# Patient Record
Sex: FEMALE | Race: WHITE | URBAN - METROPOLITAN AREA
[De-identification: names, ages, dates, MRNs, and addresses within clinical notes are randomized per-mention and may not be internally consistent; named-entity substitution may affect disease eponyms.]

---

## 2017-11-20 ENCOUNTER — HOSPITAL ENCOUNTER (OUTPATIENT)
Dept: SURGERY | Age: 62
Discharge: HOME | End: 2017-11-20
Attending: PODIATRIST | Admitting: PODIATRIST

## 2017-11-20 VITALS
SYSTOLIC BLOOD PRESSURE: 134 MMHG | OXYGEN SATURATION: 98 % | DIASTOLIC BLOOD PRESSURE: 87 MMHG | WEIGHT: 173 LBS | TEMPERATURE: 97.8 F | RESPIRATION RATE: 12 BRPM | HEIGHT: 62 IN | HEART RATE: 66 BPM | BODY MASS INDEX: 31.83 KG/M2

## 2017-11-20 RX ORDER — HYDROMORPHONE HCL 110MG/55ML
0.5 PATIENT CONTROLLED ANALGESIA SYRINGE INTRAVENOUS EVERY 5 MIN PRN
Status: DISCONTINUED | OUTPATIENT
Start: 2017-11-20 | End: 2017-11-21 | Stop reason: HOSPADM

## 2017-11-20 RX ORDER — HYDROMORPHONE HCL 110MG/55ML
0.25 PATIENT CONTROLLED ANALGESIA SYRINGE INTRAVENOUS EVERY 5 MIN PRN
Status: DISCONTINUED | OUTPATIENT
Start: 2017-11-20 | End: 2017-11-21 | Stop reason: HOSPADM

## 2017-11-20 RX ORDER — OXYCODONE HYDROCHLORIDE 5 MG/1
5 TABLET ORAL PRN
Status: ACTIVE | OUTPATIENT
Start: 2017-11-20 | End: 2017-11-20

## 2017-11-20 RX ORDER — LIDOCAINE HYDROCHLORIDE 10 MG/ML
0.5 INJECTION, SOLUTION EPIDURAL; INFILTRATION; INTRACAUDAL; PERINEURAL ONCE
Status: DISCONTINUED | OUTPATIENT
Start: 2017-11-20 | End: 2017-11-21 | Stop reason: HOSPADM

## 2017-11-20 RX ORDER — SODIUM CHLORIDE 0.9 % (FLUSH) 0.9 %
10 SYRINGE (ML) INJECTION PRN
Status: DISCONTINUED | OUTPATIENT
Start: 2017-11-20 | End: 2017-11-21 | Stop reason: HOSPADM

## 2017-11-20 RX ORDER — SODIUM CHLORIDE 9 MG/ML
INJECTION, SOLUTION INTRAVENOUS CONTINUOUS
Status: DISCONTINUED | OUTPATIENT
Start: 2017-11-20 | End: 2017-11-21 | Stop reason: HOSPADM

## 2017-11-20 RX ORDER — FENTANYL CITRATE 50 UG/ML
50 INJECTION, SOLUTION INTRAMUSCULAR; INTRAVENOUS EVERY 5 MIN PRN
Status: DISCONTINUED | OUTPATIENT
Start: 2017-11-20 | End: 2017-11-21 | Stop reason: HOSPADM

## 2017-11-20 RX ORDER — OXYCODONE HYDROCHLORIDE AND ACETAMINOPHEN 5; 325 MG/1; MG/1
1 TABLET ORAL EVERY 6 HOURS PRN
Qty: 30 TABLET | Refills: 0 | Status: SHIPPED | OUTPATIENT
Start: 2017-11-20 | End: 2017-11-27

## 2017-11-20 RX ORDER — LIDOCAINE HYDROCHLORIDE 10 MG/ML
1 INJECTION, SOLUTION EPIDURAL; INFILTRATION; INTRACAUDAL; PERINEURAL
Status: ACTIVE | OUTPATIENT
Start: 2017-11-20 | End: 2017-11-20

## 2017-11-20 RX ORDER — OXYCODONE HYDROCHLORIDE AND ACETAMINOPHEN 5; 325 MG/1; MG/1
1 TABLET ORAL
Status: COMPLETED | OUTPATIENT
Start: 2017-11-20 | End: 2017-11-20

## 2017-11-20 RX ORDER — FENTANYL CITRATE 50 UG/ML
25 INJECTION, SOLUTION INTRAMUSCULAR; INTRAVENOUS EVERY 5 MIN PRN
Status: DISCONTINUED | OUTPATIENT
Start: 2017-11-20 | End: 2017-11-21 | Stop reason: HOSPADM

## 2017-11-20 RX ORDER — SODIUM CHLORIDE 0.9 % (FLUSH) 0.9 %
10 SYRINGE (ML) INJECTION EVERY 12 HOURS SCHEDULED
Status: DISCONTINUED | OUTPATIENT
Start: 2017-11-20 | End: 2017-11-21 | Stop reason: HOSPADM

## 2017-11-20 RX ORDER — ONDANSETRON 2 MG/ML
4 INJECTION INTRAMUSCULAR; INTRAVENOUS PRN
Status: DISCONTINUED | OUTPATIENT
Start: 2017-11-20 | End: 2017-11-21 | Stop reason: HOSPADM

## 2017-11-20 RX ORDER — CEFAZOLIN SODIUM 2 G/100ML
2 INJECTION, SOLUTION INTRAVENOUS
Status: COMPLETED | OUTPATIENT
Start: 2017-11-20 | End: 2017-11-20

## 2017-11-20 RX ORDER — OXYCODONE HYDROCHLORIDE 5 MG/1
10 TABLET ORAL PRN
Status: ACTIVE | OUTPATIENT
Start: 2017-11-20 | End: 2017-11-20

## 2017-11-20 RX ORDER — MEPERIDINE HYDROCHLORIDE 25 MG/ML
12.5 INJECTION INTRAMUSCULAR; INTRAVENOUS; SUBCUTANEOUS EVERY 5 MIN PRN
Status: DISCONTINUED | OUTPATIENT
Start: 2017-11-20 | End: 2017-11-21 | Stop reason: HOSPADM

## 2017-11-20 RX ORDER — PROMETHAZINE HYDROCHLORIDE 25 MG/1
25 TABLET ORAL EVERY 6 HOURS PRN
Qty: 30 TABLET | Refills: 0 | Status: SHIPPED | OUTPATIENT
Start: 2017-11-20 | End: 2017-11-27

## 2017-11-20 RX ORDER — ONDANSETRON 2 MG/ML
4 INJECTION INTRAMUSCULAR; INTRAVENOUS
Status: ACTIVE | OUTPATIENT
Start: 2017-11-20 | End: 2017-11-20

## 2017-11-20 RX ORDER — SODIUM CHLORIDE, SODIUM LACTATE, POTASSIUM CHLORIDE, CALCIUM CHLORIDE 600; 310; 30; 20 MG/100ML; MG/100ML; MG/100ML; MG/100ML
INJECTION, SOLUTION INTRAVENOUS CONTINUOUS
Status: DISCONTINUED | OUTPATIENT
Start: 2017-11-20 | End: 2017-11-21 | Stop reason: HOSPADM

## 2017-11-20 RX ORDER — OXYCODONE HYDROCHLORIDE AND ACETAMINOPHEN 5; 325 MG/1; MG/1
TABLET ORAL
Status: DISCONTINUED
Start: 2017-11-20 | End: 2017-11-21 | Stop reason: HOSPADM

## 2017-11-20 RX ADMIN — CEFAZOLIN SODIUM 2 G: 2 INJECTION, SOLUTION INTRAVENOUS at 13:12

## 2017-11-20 RX ADMIN — SODIUM CHLORIDE, SODIUM LACTATE, POTASSIUM CHLORIDE, CALCIUM CHLORIDE: 600; 310; 30; 20 INJECTION, SOLUTION INTRAVENOUS at 11:36

## 2017-11-20 RX ADMIN — OXYCODONE HYDROCHLORIDE AND ACETAMINOPHEN 1 TABLET: 5; 325 TABLET ORAL at 15:36

## 2017-11-20 ASSESSMENT — PAIN DESCRIPTION - LOCATION: LOCATION: FOOT

## 2017-11-20 ASSESSMENT — PAIN DESCRIPTION - ORIENTATION: ORIENTATION: RIGHT

## 2017-11-20 ASSESSMENT — PAIN DESCRIPTION - PAIN TYPE: TYPE: SURGICAL PAIN

## 2017-11-20 ASSESSMENT — PAIN - FUNCTIONAL ASSESSMENT: PAIN_FUNCTIONAL_ASSESSMENT: 0-10

## 2017-11-20 ASSESSMENT — PAIN SCALES - GENERAL: PAINLEVEL_OUTOF10: 6

## 2017-11-20 NOTE — ANESTHESIA PRE-OP
[facility] Anesthesiology  Pre-Anesthesia Evaluation/Consultation       Name:  Queta Hardwick                                         Age:  65 y.o. MRN:    [id number]           Procedure (Scheduled): FOOT BUNIONECTOMY   Surgeon:     Dr. Garnett Him, DPM     Allergies   Allergen Reactions    Ibuprofen     Sulfa Antibiotics     Vicodin [Hydrocodone-Acetaminophen] Nausea And Vomiting     Patient Active Problem List   Diagnosis    Abdominal pain    Postmenopausal bleeding    Pyometra     Past Medical History:   Diagnosis Date    ADHD (attention deficit hyperactivity disorder)     Chronic back pain     Chronic interstitial cystitis     DDD (degenerative disc disease), lumbar     Hives     Nausea & vomiting     PONV (postoperative nausea and vomiting)     Thyroid disease     no meds    TIA (transient ischaemic attack)      Past Surgical History:   Procedure Laterality Date    APPENDECTOMY  1988    CERVIX SURGERY  1970s    [facility] for \"bad cells\"   [address]; 1987    x 2    CHOLECYSTECTOMY  2007    CYSTOSCOPY  2007    DILATION AND CURETTAGE OF UTERUS  9/30/10    hysteroscopy    DILATION AND CURETTAGE OF UTERUS  08/15/2011    HYSTEROSCOPY     Social History   Substance Use Topics    Smoking status: Never Smoker    Smokeless tobacco: Never Used    Alcohol use No       Prior to Admission medications    Medication Sig Start Date End Date Taking? Authorizing Provider   zolpidem (AMBIEN) 10 MG tablet Take 10 mg by mouth nightly as needed. Yes Historical Provider, MD   amphetamine-dextroamphetamine (ADDERALL) 30 MG tablet Take 30 mg by mouth daily. Yes Historical Provider, MD       Current Outpatient Prescriptions   Medication Sig Dispense Refill    zolpidem (AMBIEN) 10 MG tablet Take 10 mg by mouth nightly as needed.  amphetamine-dextroamphetamine (ADDERALL) 30 MG tablet Take 30 mg by mouth daily.        Current Facility-Administered Medications Medication Dose Route Frequency Provider Last Rate Last Dose    ceFAZolin (ANCEF) 2 g in dextrose 4 % 100 mL IVPB (premix)  2 g Intravenous On Call to [address] [place] [address]        lactated ringers infusion   Intravenous Continuous [facility] 50 mL/hr at 17 1136      lidocaine PF 1 % injection 0.5 mL  0.5 mL Intradermal Once [facility], DP        fentaNYL (SUBLIMAZE) injection 25 mcg  25 mcg Intravenous Q5 Min PRN Coral Capellan, DO        fentaNYL (SUBLIMAZE) injection 50 mcg  50 mcg Intravenous Q5 Min PRN Coral Capellan, DO        HYDROmorphone (DILAUDID) injection 0.25 mg  0.25 mg Intravenous Q5 Min PRN Coral Capellan, DO        HYDROmorphone (DILAUDID) injection 0.5 mg  0.5 mg Intravenous Q5 Min PRN Coral Capellan, DO        oxyCODONE (ROXICODONE) immediate release tablet 5 mg  5 mg Oral PRN Coral Capellan, DO        Or    oxyCODONE (ROXICODONE) immediate release tablet 10 mg  10 mg Oral PRN Coral Capellan, DO        ondansetron [facility]) injection 4 mg  4 mg Intravenous Once PRN Coral Capellan, DO        meperidine (DEMEROL) injection 12.5 mg  12.5 mg Intravenous Q5 Min PRN Coral Capellan, DO           Vital Signs  (Current)   Vitals:    17 1126   BP: 135/88   Pulse: 62   Resp: 16   Temp: 97.4 °F (36.3 °C)   SpO2: 99%     (for past 48 hrs)  Temp  Av.4 °F (36.3 °C)  Min: 97.4 °F (36.3 °C)   Min taken time: 17 1126  Max: 97.4 °F (36.3 °C)   Max taken time: 17 1126  Pulse  Av  Min: 58   Min taken time: 17 1126  Max: 58   Max taken time: 17 1126  Resp  Av  Min: 12   Min taken time: 17 1126  Max: 16   Max taken time: 17 1126  BP  Min: 135/88   Min taken time: 17 1126  Max: 135/88   Max taken time: 17 1126  SpO2  Av %  Min: 99 %   Min taken time: 17 112  Max: 99 %   Max taken time: 17 1126  (last three values)   BP Readings from Last 3 Encounters:   17 135/88   09/02/15 119/70   08/15/11 113/76       CBC  Lab Results   Component Value Date    WBC 5.1 09/02/2015    RBC 4.66 09/02/2015    HGB 13.8 09/02/2015    HCT 41.2 09/02/2015    MCV 88.4 09/02/2015    RDW 13.1 09/02/2015     09/02/2015       CMP    Lab Results   Component Value Date     09/02/2015    K 3.9 09/02/2015     09/02/2015    CO2 21 09/02/2015    BUN 11 09/02/2015    CREATININE 0.6 09/02/2015    GFRAA >60 09/02/2015    GFRAA >60 09/11/2010    LABGLOM >60 09/02/2015    GLUCOSE 127 09/02/2015    PROT 6.7 09/02/2015    CALCIUM 9.2 09/02/2015    BILITOT 0.6 09/02/2015    ALKPHOS 93 09/02/2015    AST 19 09/02/2015    ALT 15 09/02/2015       BMP    Lab Results   Component Value Date     09/02/2015    K 3.9 09/02/2015     09/02/2015    CO2 21 09/02/2015    BUN 11 09/02/2015    CREATININE 0.6 09/02/2015    CALCIUM 9.2 09/02/2015    GFRAA >60 09/02/2015    GFRAA >60 09/11/2010    LABGLOM >60 09/02/2015    GLUCOSE 127 09/02/2015       Coags   Lab Results   Component Value Date    PROTIME 14.2 03/20/2010    INR 1.20 03/19/2010    APTT 46 03/20/2010       HCG (If Applicable)   Lab Results   Component Value Date    PREGTESTUR Neg 09/11/2010        ABGs  No results found for: PHART, PO2ART, YVA8YPW, COS9KMY, BEART, T2OTZXJF     Type & Screen (If Applicable)  Lab Results   Component Value Date    LABABO O 08/10/2011    LABRH Positive 08/10/2011         POCGlucose  No results for input(s): GLUCOSE in the last 72 hours. NPO Status  > 8 hours   Date of last liquid consumption: 11/20/17   Time of last liquid consumption: 0000   Date of last solid food consumption: 11/20/17      Time of last solid consumption: 0000    BMI  Body mass index is 31.64 kg/m². Estimated body mass index is 31.64 kg/m² as calculated from the following:    Height as of this encounter: 5' 2\" (1.575 m). Weight as of this encounter: 173 lb (78.5 kg).       Additional Testing (Echo, Stress, ECG, PFTs, etc)        Anesthesia Evaluation  Patient summary reviewed and Nursing notes reviewed   history of anesthetic complications: PONV. Airway: Mallampati: II  TM distance: >3 FB   Neck ROM: full  Mouth opening: > = 3 FB Dental: normal exam         Pulmonary:normal exam                               Cardiovascular:  Exercise tolerance: good (>4 METS),       (-) past MI, CAD, dysrhythmias and  angina    ECG reviewed  Rhythm: regular  Rate: normal  Echocardiogram reviewed         Beta Blocker:  Not on Beta Blocker         Neuro/Psych:   (+) TIA, psychiatric history: stable with treatment   (-) CVA           GI/Hepatic/Renal:        (-) GERD       Endo/Other:        (-) no Type II DM               Abdominal:           Vascular:                                        Anesthesia Plan      general     ASA 3     (Plan for GETA/LMA/MAC with standard ASA monitoring. Additional monitoring as dictated by intra-operative course. Patient appropriately NPO for the procedure. Risk/Benefits reviewed with patient and all anesthetic questions answered prior to procedure.  )  Induction: intravenous. MIPS: Postoperative opioids intended and Prophylactic antiemetics administered. Anesthetic plan and risks discussed with patient. Plan discussed with CRNA. DOS STAFF ADDENDUM:    Pt seen and examined, chart reviewed (including anesthesia, drug and allergy history). No interval changes to history and physical examination. Anesthetic plan, risks, benefits, alternatives, and personnel involved discussed with patient. Patient verbalized an understanding and agrees to proceed.       Shell Villanueva DO  November 20, 2017  12:27 PM

## 2017-11-20 NOTE — H&P
1987); Cholecystectomy (2007); Cervix surgery (1970s); Dilation and curettage of uterus (9/30/10); and Dilation and curettage of uterus (08/15/2011). Medications:  Current Outpatient Prescriptions on File Prior to Encounter   Medication Sig Dispense Refill    zolpidem (AMBIEN) 10 MG tablet Take 10 mg by mouth nightly as needed.  amphetamine-dextroamphetamine (ADDERALL) 30 MG tablet Take 30 mg by mouth daily. No current facility-administered medications on file prior to encounter. Allergies: Allergies   Allergen Reactions    Ibuprofen     Sulfa Antibiotics     Vicodin [Hydrocodone-Acetaminophen] Nausea And Vomiting        Social History:   reports that she has never smoked. She has never used smokeless tobacco. She reports that she does not drink alcohol or use drugs. Family History:  family history includes Cancer in her father and sister; Diabetes in her brother. Physical Exam:  /88   Pulse 62   Temp 97.4 °F (36.3 °C) (Temporal)   Resp 16   Ht 5' 2\" (1.575 m)   Wt 173 lb (78.5 kg)   SpO2 99%   BMI 31.64 kg/m²     General appearance:  Appears comfortable. Well nourished  Eyes: Sclera clear, pupils equal  ENT: Moist mucus membranes, no thrush. Trachea midline. Cardiovascular: Regular rhythm, normal S1, S2. No murmur, gallop, rub. No edema in lower extremities  Respiratory: Clear to auscultation bilaterally, no wheeze, good inspiratory effort  Gastrointestinal: Abdomen soft, non-tender, not distended, normal bowel sounds  Musculoskeletal: No cyanosis in digits, neck supple  Neurology: Cranial nerves grossly intact. Alert and oriented in time, place and person. No speech or motor deficits  Psychiatry: Appropriate affect.  Not agitated  Skin: Warm, dry, normal turgor, no rash    Labs:  CBC:   Lab Results   Component Value Date    WBC 5.1 09/02/2015    RBC 4.66 09/02/2015    HGB 13.8 09/02/2015    HCT 41.2 09/02/2015    MCV 88.4 09/02/2015    MCH 29.7 09/02/2015    [facility] 33.6 09/02/2015    RDW 13.1 09/02/2015     09/02/2015    MPV 7.6 09/02/2015     BMP:    Lab Results   Component Value Date     09/02/2015    K 3.9 09/02/2015     09/02/2015    CO2 21 09/02/2015    BUN 11 09/02/2015    CREATININE 0.6 09/02/2015    CALCIUM 9.2 09/02/2015    GFRAA >60 09/02/2015    GFRAA >60 09/11/2010    LABGLOM >60 09/02/2015    GLUCOSE 127 09/02/2015           Problem List  Active Problems:    * No active hospital problems. *        Assessment & Recommendation:     1. Bunion-Patient is medically optimized for surgery. Thank you for the opportunity to participate in the care of your patient.   Kathleen Locke PA-C    11/20/2017 11:58 AM

## 2017-11-20 NOTE — PROGRESS NOTES
Discharge instructions went over with patient and patient's friend. Pain prescription with patient. Surgical shoe and crutches given to patient.   Electronically signed by Bala Arreola RN on 11/20/2017 at 3:51 PM

## 2017-11-20 NOTE — PROGRESS NOTES
All personal belongings with patient.  Friend taking patient home in stable condition    Electronically signed by Jay Mason RN on 11/20/2017 at [id number]

## 2017-11-20 NOTE — PROGRESS NOTES
OPERATIVE REPORT    Ayala Marcano  [id number]  YOB: 1955    Surgeon: Mattie Black DPM  Assistant: none  Pre-operative Diagnosis: Right foot bunion  Post-operative Diagnosis: Right foot bunion  Procedure: Roni bunionectomy  Pathology: none obtained  Anesthesia: MAC and local  Hemostasis: Ankle tourniquet, total time 57 minutes  Est. 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        INDICATIONS FOR PROCEDURE: This patient has signs and symptoms clinically consistent with the above mentioned preoperative diagnosis. Having failed conservative treatment, it was determined that the patient would benefit from surgical intervention. All potential risks, benefits, and complications were discussed with the patient prior to the scheduling of surgery. The patient wished to proceed with surgery, and informed written consent was obtained.      DETAILS OF PROCEDURE: The patient was brought from the pre-operative area and placed on the operating table in the supine position. A pneumatic ankle tourniquet was placed around the patient's well-padded right ankle. At this time a time out was taken to identify the patient, allergies, time of prophylactic antibiotic administration, and operative side and site. A local anesthetic block  was then injected proximal to the incision site. The right  lower extremity was then scrubbed, prepped, and draped in the usual sterile fashion. An Esmarch bandage was then utilized to exsanguinate the patient's left lower extremity. The tourniquet was then inflated to 250 mmHg.     Details of Procedure #1: At this time, attention was directed to the dorsal aspect of the patients right foot.   Using a #15 blade, a 6 cm curvilinear incision was made over the dorsal medial aspect pointed distally and the arms pointed proximoplantar and proximodorsal.  Upon completion of the osteotomy, the capital fragment was distracted and shifted laterally to correct the previously abnormal intermetatarsal angle and then impacted onto the first metatarsal shaft. A k-wire was then driven from dorsal to plantar across the osteotomy site to serve as temporary fixation. Following the manufacturers recommended insertion technique, 2 bioabsorbable pins were placed across the osteotomy site. The osteotomy was held together with bone reduction forceps to maintain compression at the sites.      Attention was then directed to the medial bone shelf remaining after lateral translation of the metatarsal head on the shaft. The bone shelf was resected utilizing the sagittal bone saw and passed from the operative site. A power rasp was utilized to smooth all bony prominences. Correction of the deformity was assessed at this time. Using live intraoperative fluoroscopy, the position of the hardware and the correction of the deformity were noted to be excellent. The wound was then flushed with copious amounts of sterile normal saline. Next, to tighten up the medial capsular tissues, the redundant capsule around the 1st metatarsal phalangeal joint was resected via a medial capsulorrhaphy. The remaining capsule and periosteal structures were reapproximated utilizing 3-0 vicryl. The subcutaneous layer was then reapproximated using 4-0 vicryl and the skin was closed in a running intradermal fashion using 4-0 monocryl suture.        The patient tolerated the procedure and anesthesia well. The patient was transported from the OR to the PACU with vital signs stable and vascular status intact to all digits of the left foot. Following a short period of post-operative monitoring the patient is to be discharged home. The patient will need to follow up  in 5-7 days.   Call the office for an appointment and if any

## 2017-11-20 NOTE — PROGRESS NOTES
Brief Operative Report        Pre-operative Diagnosis:  Right foot bunion    Post-operative Diagnosis:  Same    Procedure: Right foot bunion correction    Surgeon:  Dr. Kumar Lang     Assistant(s):  none    Anesthesia:  Local anesthesia with sedation    Estimated blood loss:  Less than 50 ml    Drains:  none    Specimens:  none    Implants:  Osteomed absorbable pins    Findings:  Soft bone, viable soft tissue    Complications:  none    Condition:  Stable    Weight Bearing Status:  Partial weight bearing (30-50%)    Activity:  Activity as tolerated (Patient may move about with assist as indicated or with supervision.)    See dictated operative report for full details.

## 2017-11-20 NOTE — PROGRESS NOTES
Patient awake and alert. Oxygen saturation 100% on room air. NSR on the monitor. VSS. drsg to right foot CDI. Skin warm, brisk cap refill, unable to palpate pedal pulse d/t dressing in place. Patient denies pain. Patient denies nausea. Patient meets criteria to be discharged from phase 1.  Will prepare for discharge home in phase 2    Electronically signed by Marek Valero RN on 11/20/2017 at 1450

## 2017-11-20 NOTE — PROGRESS NOTES
Patient arrived from OR to PACU. Oxygen saturation 98% on room air. NSR on the monitor. VSS. drsg to right foot CDI. Skin warm, brisk cap refill, unable to palpate pedal pulse d/t dressing in place. Ice applied.  Will continue to monitor    Electronically signed by Eduardo Rivera RN on 11/20/2017 at

## 2017-12-29 LAB
HPV COMMENT: NORMAL
HPV TYPE 16: NOT DETECTED
HPV TYPE 18: NOT DETECTED
HPVOH (OTHER TYPES): NOT DETECTED

## 2024-01-12 ENCOUNTER — HOSPITAL ENCOUNTER (OUTPATIENT)
Dept: MAMMOGRAPHY | Age: 69
Discharge: HOME | End: 2024-01-12
Payer: COMMERCIAL

## 2024-01-12 VITALS — BODY MASS INDEX: 35.88 KG/M2 | WEIGHT: 195 LBS | HEIGHT: 62 IN

## 2024-01-12 DIAGNOSIS — Z12.31 SCREENING MAMMOGRAM FOR BREAST CANCER: ICD-10-CM

## 2024-01-12 PROCEDURE — 77063 BREAST TOMOSYNTHESIS BI: CPT

## 2024-01-16 ENCOUNTER — HOSPITAL ENCOUNTER (OUTPATIENT)
Dept: CT IMAGING | Age: 69
Discharge: HOME | End: 2024-01-16
Payer: COMMERCIAL

## 2024-01-16 DIAGNOSIS — R10.13 EPIGASTRIC PAIN: ICD-10-CM

## 2024-01-16 DIAGNOSIS — Z98.890 PERSONAL HISTORY OF SURGERY TO HEART AND GREAT VESSELS, PRESENTING HAZARDS TO HEALTH: ICD-10-CM

## 2024-01-16 PROCEDURE — 74176 CT ABD & PELVIS W/O CONTRAST: CPT
